# Patient Record
Sex: FEMALE | Race: WHITE | URBAN - METROPOLITAN AREA
[De-identification: names, ages, dates, MRNs, and addresses within clinical notes are randomized per-mention and may not be internally consistent; named-entity substitution may affect disease eponyms.]

---

## 2023-01-25 ENCOUNTER — APPOINTMENT (RX ONLY)
Dept: URBAN - METROPOLITAN AREA CLINIC 312 | Facility: CLINIC | Age: 38
Setting detail: DERMATOLOGY
End: 2023-01-25

## 2023-01-25 DIAGNOSIS — L259 CONTACT DERMATITIS AND OTHER ECZEMA, UNSPECIFIED CAUSE: ICD-10-CM | Status: INADEQUATELY CONTROLLED

## 2023-01-25 PROBLEM — L30.9 DERMATITIS, UNSPECIFIED: Status: ACTIVE | Noted: 2023-01-25

## 2023-01-25 PROCEDURE — ? MEDICATION COUNSELING

## 2023-01-25 PROCEDURE — ? PATIENT SPECIFIC COUNSELING

## 2023-01-25 PROCEDURE — 99204 OFFICE O/P NEW MOD 45 MIN: CPT

## 2023-01-25 PROCEDURE — ? PRESCRIPTION

## 2023-01-25 RX ORDER — TRIAMCINOLONE ACETONIDE 1 MG/G
OINTMENT TOPICAL
Qty: 454 | Refills: 1 | Status: ERX | COMMUNITY
Start: 2023-01-25

## 2023-01-25 RX ADMIN — TRIAMCINOLONE ACETONIDE: 1 OINTMENT TOPICAL at 00:00

## 2023-01-25 ASSESSMENT — ITCH NUMERIC RATING SCALE: HOW SEVERE IS YOUR ITCHING?: 5

## 2023-01-25 ASSESSMENT — BSA RASH: BSA RASH: 1

## 2023-01-25 ASSESSMENT — LOCATION DETAILED DESCRIPTION DERM: LOCATION DETAILED: RIGHT ANTERIOR SHOULDER

## 2023-01-25 ASSESSMENT — LOCATION ZONE DERM: LOCATION ZONE: ARM

## 2023-01-25 ASSESSMENT — LOCATION SIMPLE DESCRIPTION DERM: LOCATION SIMPLE: RIGHT SHOULDER

## 2023-01-25 NOTE — PROCEDURE: PATIENT SPECIFIC COUNSELING
Cutaneous findings are minimal at today's visit. Hx is suggestive of dermititis including eczema. Thorough discussion regarding eczema including dyshidrotic eczema. Pt also expressed concerns regarding autoimmune conditions. Hx does not appear to be c/w with lupus or pemphigus, etc although more definitive assessment can be made with eval of active rash and/or bx.\\n\\nThorough discussion on gentle skin care. Numerous samples provided. Encouraged Cerave cleanser and Aquaphor spray after showering.\\n\\nPt advised to reinstate oral antihistamines as directed.\\n\\nPT IS TO WEAN OFF PREDNISONE, AS SHE HAS BEEN ON A 3YR COURSE AT 20MG QD. DISCUSSED POTENTIAL S/E INCLUDING--Bleeding Of The Stomach Or Intestines. Cushing's Syndrome. Diabetes. Increase Of White Blood Cells. Low Amount Of Calcium In The Blood. Osteoporosis. SHE IS TO VERIFY WITH PCP THAT PRIOR LAB WORK HAS BEEN DONE TO R/O ANY OF THESE CONDIITONS. ADDITIONAL BLOODWORK MAY BE NECESSARY. PT VERBALIZES THE POTENTIAL SERIOUSNESS OF THESE S/E AND UNDERSTANDS IT IS HER RESPONSIBILITY TO FOLLOW THROUGH WITH MONITORING FOR THESE ISSUES.\\n\\Christa THIS POINT WILL CONTINUE WITH 20MG QD X 5DAYS (UNTIL TOPICAL IS RECIEVED FROM [facility]), THEN 10MG QD X 2WKS THEN 5MG X 4WKS THEN D/C.\\n\\nRX WILL BE SENT IN FOR TAC CREAM. OFTEN TIMES WHEN ORAL PREDNISONE IS D/C PTS EXPERIENCE A REBOUND FLARE. THESE FLARES ARE TO BE TREATED WITH TOPICAL BID AT FIRST SIGN OF BREAK OUT.\\n\\nIF CONDITION FLARES OR FAILS TO RESOND PT TO F/U WHEN RASH IS ACTIVE AND A DIAGNOSTIC BX WILL BE PREFORMED AT THAT TIME.\\n Cutaneous findings are minimal at today's visit. Hx is suggestive of dermititis including eczema. Thorough discussion regarding eczema including dyshidrotic eczema. Pt also expressed concerns regarding autoimmune conditions. Hx does not appear to be c/w with lupus or pemphigus, etc although more definitive assessment can be made with eval of active rash and/or bx.\\n\\nThorough discussion on gentle skin care. Numerous samples provided. Encouraged Cerave cleanser and Aquaphor spray after showering.\\n\\nPt advised to reinstate oral antihistamines as directed.\\n\\nPT IS TO WEAN OFF PREDNISONE, AS SHE HAS BEEN ON A 3YR COURSE AT 20MG QD. DISCUSSED POTENTIAL S/E INCLUDING--Bleeding Of The Stomach Or Intestines. Cushing's Syndrome. Diabetes. Increase Of White Blood Cells. Low Amount Of Calcium In The Blood. Osteoporosis. SHE IS TO VERIFY WITH PCP THAT PRIOR LAB WORK HAS BEEN DONE TO R/O ANY OF THESE CONDIITONS. ADDITIONAL BLOODWORK MAY BE NECESSARY. PT VERBALIZES THE POTENTIAL SERIOUSNESS OF THESE S/E AND UNDERSTANDS IT IS HER RESPONSIBILITY TO FOLLOW THROUGH WITH MONITORING FOR THESE ISSUES.\\n\\Christa THIS POINT WILL CONTINUE WITH 20MG QD X 5DAYS (UNTIL TOPICAL IS RECIEVED FROM [facility]), THEN 10MG QD X 2WKS THEN 5MG X 4WKS THEN D/C.\\n\\nRX WILL BE SENT IN FOR TAC CREAM. OFTEN TIMES WHEN ORAL PREDNISONE IS D/C PTS EXPERIENCE A REBOUND FLARE. THESE FLARES ARE TO BE TREATED WITH TOPICAL BID AT FIRST SIGN OF BREAK OUT.\\n\\nIF CONDITION FLARES OR FAILS TO RESOND PT TO F/U WHEN RASH IS ACTIVE AND A DIAGNOSTIC BX WILL BE PREFORMED AT THAT TIME.\\n

## 2023-01-25 NOTE — PROCEDURE: MEDICATION COUNSELING
HTN (hypertension) Tetracycline Counseling: Patient counseled regarding possible photosensitivity and increased risk for sunburn.  Patient instructed to avoid sunlight, if possible.  When exposed to sunlight, patients should wear protective clothing, sunglasses, and sunscreen.  The patient was instructed to call the office immediately if the following severe adverse effects occur:  hearing changes, easy bruising/bleeding, severe headache, or vision changes.  The patient verbalized understanding of the proper use and possible adverse effects of tetracycline.  All of the patient's questions and concerns were addressed. Patient understands to avoid pregnancy while on therapy due to potential birth defects.

## 2023-06-09 NOTE — PROCEDURE: MEDICATION COUNSELING
Xolair Counseling:  Patient informed of potential adverse effects including but not limited to fever, muscle aches, rash and allergic reactions.  The patient verbalized understanding of the proper use and possible adverse effects of Xolair.  All of the patient's questions and concerns were addressed. Ketoconazole Pregnancy And Lactation Text: This medication is Pregnancy Category C and it isn't know if it is safe during pregnancy. It is also excreted in breast milk and breast feeding isn't recommended.

## 2024-11-10 NOTE — PROCEDURE: MEDICATION COUNSELING
Patent 54.2 Ketoconazole Pregnancy And Lactation Text: This medication is Pregnancy Category C and it isn't know if it is safe during pregnancy. It is also excreted in breast milk and breast feeding isn't recommended.